# Patient Record
(demographics unavailable — no encounter records)

---

## 2025-04-01 NOTE — DISCUSSION/SUMMARY
[FreeTextEntry1] : Headache Nose injury/swelling  Plan Nose injury Medication given Increase PO fluid intake and rest. Discussed importance of eating a healthy breakfast and lunch. Stress management and sleep hygiene discussed. TC to parent: spoke to Mom Anticipatory guidance given

## 2025-04-01 NOTE — HISTORY OF PRESENT ILLNESS
[de-identified] : nose injury [FreeTextEntry6] : 13 year old female    Home: lives with Mom and Dad; 3 sister 16 yr old  twins, 18 yr brother 20 year Just moved from [place] in November 2024  Has made friends Ed: 7th grade in [employer] Good student Activity; likes to eat No substance use No mental health issues normal rate and rhythm, no chest pain and no edema.

## 2025-04-01 NOTE — PHYSICAL EXAM
[Acute Distress] : acute distress [NL] : nonerythematous oropharynx [FreeTextEntry4] : small laceration  and swelling on bridge of nose [de-identified] : alert; oriented; equal strength bilaterally ; good balance; clear speech